# Patient Record
Sex: FEMALE | Race: WHITE
[De-identification: names, ages, dates, MRNs, and addresses within clinical notes are randomized per-mention and may not be internally consistent; named-entity substitution may affect disease eponyms.]

---

## 2017-01-06 ENCOUNTER — HOSPITAL ENCOUNTER (OUTPATIENT)
Dept: HOSPITAL 53 - M SDC | Age: 22
End: 2017-01-06
Attending: OBSTETRICS & GYNECOLOGY
Payer: COMMERCIAL

## 2017-01-06 VITALS — SYSTOLIC BLOOD PRESSURE: 110 MMHG | DIASTOLIC BLOOD PRESSURE: 74 MMHG

## 2017-01-06 VITALS — WEIGHT: 115 LBS | HEIGHT: 63 IN | BODY MASS INDEX: 20.38 KG/M2

## 2017-01-06 DIAGNOSIS — Z88.2: ICD-10-CM

## 2017-01-06 DIAGNOSIS — N94.10: Primary | ICD-10-CM

## 2017-01-06 LAB
CONTROL LINE HCG: (no result)
ERYTHROCYTE [DISTWIDTH] IN BLOOD BY AUTOMATED COUNT: 14.1 % (ref 11.5–14.5)
MCH RBC QN AUTO: 28.5 PG (ref 27–33)
MCHC RBC AUTO-ENTMCNC: 33.7 G/DL (ref 32–36.5)
MCV RBC AUTO: 84.5 FL (ref 80–96)
PLATELET # BLD AUTO: 433 K/MM3 (ref 150–450)
WBC # BLD AUTO: 8.5 K/MM3 (ref 4–10)

## 2017-01-06 PROCEDURE — 56800 PLASTIC REPAIR INTROITUS: CPT

## 2017-01-06 PROCEDURE — 86850 RBC ANTIBODY SCREEN: CPT

## 2017-01-06 PROCEDURE — 88302 TISSUE EXAM BY PATHOLOGIST: CPT

## 2017-01-06 PROCEDURE — 85027 COMPLETE CBC AUTOMATED: CPT

## 2017-01-06 PROCEDURE — 36415 COLL VENOUS BLD VENIPUNCTURE: CPT

## 2017-01-06 PROCEDURE — 84703 CHORIONIC GONADOTROPIN ASSAY: CPT

## 2017-01-06 PROCEDURE — 86900 BLOOD TYPING SEROLOGIC ABO: CPT

## 2017-01-06 PROCEDURE — 86901 BLOOD TYPING SEROLOGIC RH(D): CPT

## 2017-01-06 RX ADMIN — FENTANYL CITRATE PRN MCG: 50 INJECTION, SOLUTION INTRAMUSCULAR; INTRAVENOUS at 09:06

## 2017-01-06 RX ADMIN — FENTANYL CITRATE PRN MCG: 50 INJECTION, SOLUTION INTRAMUSCULAR; INTRAVENOUS at 09:15

## 2017-01-06 RX ADMIN — KETOROLAC TROMETHAMINE SCH MG: 30 INJECTION, SOLUTION INTRAMUSCULAR at 09:26

## 2017-01-06 RX ADMIN — KETOROLAC TROMETHAMINE SCH MG: 30 INJECTION, SOLUTION INTRAMUSCULAR at 09:06

## 2017-01-06 NOTE — RO
DATE OF PROCEDURE:  2017

 

PREPROCEDURE DIAGNOSIS:    Dyspareunia.

 

POSTPROCEDURE DIAGNOSIS:    Dyspareunia.

 

PROCEDURE PERFORMED:  Perineorrhaphy.

 

SURGEON:  Roxy Ortiz MD

 

ASSISTANT:  None.

 

ANESTHESIA:  General.

 

ESTIMATED BLOOD LOSS:  10 mL.

 

INTRAVENOUS FLUIDS:  1400 mL of lactated Ringer solution.

 

SPECIMENS:  Vaginal mucosa.

 

INDICATION FOR OPERATION:  Mrs. Lambert is a 21-year-old,  2, para 2 who

presents with several months of insertional dyspareunia following a vaginal

delivery.  She reports having a vaginal delivery where she sustained a 2nd 
degree

midline laceration and left labial laceration.  The labia was excised upon

delivery and her 2nd degree was repaired, which was described in a normal

fashion.  Several months after her delivery, she experienced dyspareunia and has

continued to exhibit dyspareunia right along the area of where she sustained a

laceration.  On examination, she had some redundant tissue and tenderness along

this area.  She was counseled regarding risk of surgery and desired to proceed

with the revision of this scar.

 

DESCRIPTION OF PROCEDURE:   After informed consent was obtained and written

consent was reviewed, the patient was brought to the operating room where 
general

endotracheal anesthesia was obtained.  She was then placed in lithotomy position

and was prepped and draped in the normal sterile fashion.  A time out in the

operating room was then performed identifying the patient, the procedure

performed, as well as drug allergies.  The area of the incision was marked in a

triangular shape, approximately 1.5 x 1 cm area of redundant tissue where the

previous scar was.  This area was then infused with 1% lidocaine with 
epinephrine

and was excised.  Hemostasis was then achieved.  The posterior edge of the

vaginal mucosa was then undermined to release tension off the vaginal repair.

This  was further dissected out of the lateral aspect of the vaginal mucosa.  

The area was then reapproximated using #3-0 Vicryl Rapide with

interrupted sutures.  Hemostasis was achieved.  The vaginal incision was felt to

be off of tension, and the patient was then cleaned, was awakened from general

anesthesia, taken to recovery in stable condition.

JUAN

## 2019-03-27 ENCOUNTER — HOSPITAL ENCOUNTER (OUTPATIENT)
Dept: HOSPITAL 53 - M LAB REF | Age: 24
End: 2019-03-27
Attending: PHYSICIAN ASSISTANT
Payer: COMMERCIAL

## 2019-03-27 DIAGNOSIS — N39.0: Primary | ICD-10-CM

## 2019-03-27 LAB
APPEARANCE UR: (no result)
BACTERIA UR QL AUTO: (no result)
BILIRUB UR QL STRIP.AUTO: NEGATIVE
GLUCOSE UR QL STRIP.AUTO: NEGATIVE MG/DL
HGB UR QL STRIP.AUTO: (no result)
KETONES UR QL STRIP.AUTO: NEGATIVE MG/DL
LEUKOCYTE ESTERASE UR QL STRIP.AUTO: (no result)
MUCOUS THREADS URNS QL MICRO: (no result)
NITRITE UR QL STRIP.AUTO: POSITIVE
PH UR STRIP.AUTO: 6 UNITS (ref 5–9)
PROT UR QL STRIP.AUTO: (no result) MG/DL
RBC # UR AUTO: 2 /HPF (ref 0–3)
SP GR UR STRIP.AUTO: 1 (ref 1–1.03)
SQUAMOUS #/AREA URNS AUTO: 0 /HPF (ref 0–6)
UROBILINOGEN UR QL STRIP.AUTO: 0.2 MG/DL (ref 0–2)
WBC #/AREA URNS AUTO: (no result) /HPF (ref 0–3)

## 2020-01-07 ENCOUNTER — HOSPITAL ENCOUNTER (OUTPATIENT)
Dept: HOSPITAL 53 - M WHC | Age: 25
End: 2020-01-07
Attending: SURGERY
Payer: COMMERCIAL

## 2020-01-07 DIAGNOSIS — N63.20: ICD-10-CM

## 2020-01-07 DIAGNOSIS — N63.10: Primary | ICD-10-CM

## 2020-03-31 ENCOUNTER — HOSPITAL ENCOUNTER (EMERGENCY)
Dept: HOSPITAL 53 - M ED | Age: 25
Discharge: HOME | End: 2020-03-31
Payer: COMMERCIAL

## 2020-03-31 VITALS — DIASTOLIC BLOOD PRESSURE: 86 MMHG | SYSTOLIC BLOOD PRESSURE: 115 MMHG

## 2020-03-31 VITALS — BODY MASS INDEX: 17.85 KG/M2 | WEIGHT: 100.75 LBS | HEIGHT: 63 IN

## 2020-03-31 DIAGNOSIS — R11.0: ICD-10-CM

## 2020-03-31 DIAGNOSIS — J02.9: Primary | ICD-10-CM

## 2020-03-31 DIAGNOSIS — R68.83: ICD-10-CM

## 2020-03-31 DIAGNOSIS — Z88.2: ICD-10-CM

## 2020-03-31 LAB
BASOPHILS # BLD AUTO: 0 10^3/UL (ref 0–0.2)
BASOPHILS NFR BLD AUTO: 0.5 % (ref 0–1)
EOSINOPHIL # BLD AUTO: 0.1 10^3/UL (ref 0–0.5)
EOSINOPHIL NFR BLD AUTO: 0.7 % (ref 0–3)
HCT VFR BLD AUTO: 43.9 % (ref 36–47)
HETEROPH AB SER QL LA: NEGATIVE
HGB BLD-MCNC: 15 G/DL (ref 12–15.5)
LYMPHOCYTES # BLD AUTO: 1.6 10^3/UL (ref 1.5–5)
LYMPHOCYTES NFR BLD AUTO: 18.7 % (ref 24–44)
MCH RBC QN AUTO: 30.3 PG (ref 27–33)
MCHC RBC AUTO-ENTMCNC: 34.2 G/DL (ref 32–36.5)
MCV RBC AUTO: 88.7 FL (ref 80–96)
MONOCYTES # BLD AUTO: 0.6 10^3/UL (ref 0–0.8)
MONOCYTES NFR BLD AUTO: 6.7 % (ref 0–5)
NEUTROPHILS # BLD AUTO: 6.1 10^3/UL (ref 1.5–8.5)
NEUTROPHILS NFR BLD AUTO: 73.2 % (ref 36–66)
PLATELET # BLD AUTO: 252 10^3/UL (ref 150–450)
RBC # BLD AUTO: 4.95 10^6/UL (ref 4–5.4)
WBC # BLD AUTO: 8.3 10^3/UL (ref 4–10)

## 2020-03-31 PROCEDURE — 87880 STREP A ASSAY W/OPTIC: CPT

## 2020-03-31 PROCEDURE — 99284 EMERGENCY DEPT VISIT MOD MDM: CPT

## 2020-03-31 PROCEDURE — 86308 HETEROPHILE ANTIBODY SCREEN: CPT

## 2020-03-31 PROCEDURE — 36415 COLL VENOUS BLD VENIPUNCTURE: CPT

## 2020-03-31 PROCEDURE — 85025 COMPLETE CBC W/AUTO DIFF WBC: CPT

## 2020-10-09 ENCOUNTER — HOSPITAL ENCOUNTER (OUTPATIENT)
Dept: HOSPITAL 53 - M WHC | Age: 25
End: 2020-10-09
Attending: SURGERY
Payer: COMMERCIAL

## 2020-10-09 DIAGNOSIS — N63.20: ICD-10-CM

## 2020-10-09 DIAGNOSIS — N63.10: Primary | ICD-10-CM

## 2020-10-14 NOTE — REP
BILATERAL BREAST ULTRASOUND 



HISTORY: Follow-up bilateral nodules. 



COMPARISON: 01/07/2020. 



TECHNIQUE: Real-time sonographic evaluation of bilateral breasts performed and 
compared to prior study. 



FINDINGS: 

Once again at the 10 o'clock position of the right breast, a hypoechoic nodule 
demonstrates a couple of thin hyperechoic septations internally and appears 
solid, measuring 5 x 4 x 6 mm. This is stable. Similarly in the left breast at 6
o'clock 2 cm from the nipple, there is a stable hypoechoic solid appearing 
nodule measuring 6 x 3 x 5 mm. These are most consistent with small 
fibroadenomas. Recommend six month follow-up ultrasound.   



IMPRESSION: 

BI-RADS Category 3 probably benign bilateral breast ultrasound. Stable 
subcentimeter nodule again seen right breast 10 o'clock and left breast 6 
o'clock. Recommend six month follow-up ultrasound.

MTDD

## 2020-11-16 ENCOUNTER — HOSPITAL ENCOUNTER (OUTPATIENT)
Dept: HOSPITAL 53 - M SFHCWAGY | Age: 25
End: 2020-11-16
Attending: NURSE PRACTITIONER
Payer: MEDICAID

## 2020-11-16 DIAGNOSIS — Z12.4: Primary | ICD-10-CM

## 2021-04-12 ENCOUNTER — HOSPITAL ENCOUNTER (OUTPATIENT)
Dept: HOSPITAL 53 - M WHC | Age: 26
End: 2021-04-12
Attending: SURGERY
Payer: COMMERCIAL

## 2021-04-12 DIAGNOSIS — N63.20: ICD-10-CM

## 2021-04-12 DIAGNOSIS — N63.10: Primary | ICD-10-CM

## 2021-04-12 NOTE — REP
INDICATION:

OSWALDO BREAST LUMPS,ABN BREAST U/S,MASTODYNIA RT BREAST.



COMPARISON:

01/07/2020, 10/09/2020.



TECHNIQUE:

Real-time sonographic evaluation of entire bilateral breasts performed.



FINDINGS:

Once again in the right breast at 10 o'clock, approximately 5 cm from nipple, there is

a hypoechoic nodule which appears stable measuring 7 x 3 x 6 mm.  A benign cyst at 12

o'clock measures 8 x 5 x 7 mm and there is a benign retroareolar cyst 5 mm.



In the left breast at 6 o'clock a hypoechoic nodule is stable measuring 5 x 3 x 5 mm.

This is 3 cm from the nipple.  Note is made of a hypoechoic nodule 11 o'clock 2 cm

from the nipple measuring 6 x 3 x 5 mm.





IMPRESSION:

BIRADS/ACR category 3, probably benign.  Stable hypoechoic nodule right breast 10

o'clock and left breast 6 o'clock.  A subcentimeter hypoechoic nodule is also seen at

11 o'clock left breast.  These likely represent fibroadenomas.  Recommend six-month

follow-up ultrasound bilateral breasts.



RECOMMENDATION:

Recommend six-month follow-up ultrasound bilateral breasts.





<Electronically signed by Bolivar Jorge > 04/12/21 1723